# Patient Record
Sex: FEMALE | Race: WHITE | NOT HISPANIC OR LATINO | Employment: FULL TIME | URBAN - METROPOLITAN AREA
[De-identification: names, ages, dates, MRNs, and addresses within clinical notes are randomized per-mention and may not be internally consistent; named-entity substitution may affect disease eponyms.]

---

## 2024-04-05 ENCOUNTER — HOSPITAL ENCOUNTER (OUTPATIENT)
Dept: CARDIOLOGY | Facility: HOSPITAL | Age: 44
Discharge: HOME | End: 2024-04-05
Payer: COMMERCIAL

## 2024-04-05 VITALS — BODY MASS INDEX: 25.51 KG/M2 | WEIGHT: 158.73 LBS | HEIGHT: 66 IN

## 2024-04-05 VITALS
BODY MASS INDEX: 25.39 KG/M2 | WEIGHT: 158 LBS | HEIGHT: 66 IN | DIASTOLIC BLOOD PRESSURE: 82 MMHG | HEART RATE: 100 BPM | SYSTOLIC BLOOD PRESSURE: 124 MMHG

## 2024-04-05 DIAGNOSIS — R00.2 PALPITATIONS: ICD-10-CM

## 2024-04-05 LAB
ASCENDING AORTA: 2.7 CM
BH CV ECHO MEAS - AO MAX PG: 5.7 MMHG
BH CV ECHO MEAS - AO MEAN PG: 3.3 MMHG
BH CV ECHO MEAS - AO ROOT DIAM: 2.7 CM
BH CV ECHO MEAS - AO V2 MAX: 119.1 CM/SEC
BH CV ECHO MEAS - AO V2 VTI: 19.9 CM
BH CV ECHO MEAS - AVA(I,D): 2.9 CM2
BH CV ECHO MEAS - EDV(CUBED): 82.5 ML
BH CV ECHO MEAS - EDV(MOD-SP2): 76.4 ML
BH CV ECHO MEAS - EDV(MOD-SP4): 43.4 ML
BH CV ECHO MEAS - EF(MOD-BP): 61 %
BH CV ECHO MEAS - EF(MOD-SP2): 67.7 %
BH CV ECHO MEAS - EF(MOD-SP4): 54.2 %
BH CV ECHO MEAS - ESV(CUBED): 17.5 ML
BH CV ECHO MEAS - ESV(MOD-SP2): 24.7 ML
BH CV ECHO MEAS - ESV(MOD-SP4): 19.9 ML
BH CV ECHO MEAS - FS: 40.3 %
BH CV ECHO MEAS - IVS/LVPW: 0.8 CM
BH CV ECHO MEAS - IVSD: 0.72 CM
BH CV ECHO MEAS - LA DIMENSION: 3.2 CM
BH CV ECHO MEAS - LAT PEAK E' VEL: 17 CM/SEC
BH CV ECHO MEAS - LV MASS(C)D: 109.4 GRAMS
BH CV ECHO MEAS - LV MAX PG: 4.3 MMHG
BH CV ECHO MEAS - LV MEAN PG: 2.39 MMHG
BH CV ECHO MEAS - LV V1 MAX: 103.7 CM/SEC
BH CV ECHO MEAS - LV V1 VTI: 17.8 CM
BH CV ECHO MEAS - LVIDD: 4.4 CM
BH CV ECHO MEAS - LVIDS: 2.6 CM
BH CV ECHO MEAS - LVOT AREA: 3.2 CM2
BH CV ECHO MEAS - LVOT DIAM: 2.03 CM
BH CV ECHO MEAS - LVPWD: 0.9 CM
BH CV ECHO MEAS - MED PEAK E' VEL: 9 CM/SEC
BH CV ECHO MEAS - MV A MAX VEL: 86.9 CM/SEC
BH CV ECHO MEAS - MV DEC SLOPE: 574.7 CM/SEC2
BH CV ECHO MEAS - MV DEC TIME: 0.14 SEC
BH CV ECHO MEAS - MV E MAX VEL: 79 CM/SEC
BH CV ECHO MEAS - MV E/A: 0.91
BH CV ECHO MEAS - MV MAX PG: 2.6 MMHG
BH CV ECHO MEAS - MV MEAN PG: 1.45 MMHG
BH CV ECHO MEAS - MV V2 VTI: 17 CM
BH CV ECHO MEAS - MVA(VTI): 3.4 CM2
BH CV ECHO MEAS - PA ACC TIME: 0.09 SEC
BH CV ECHO MEAS - PA V2 MAX: 124.3 CM/SEC
BH CV ECHO MEAS - PI END-D VEL: 103.7 CM/SEC
BH CV ECHO MEAS - RAP SYSTOLE: 3 MMHG
BH CV ECHO MEAS - RVSP: 15 MMHG
BH CV ECHO MEAS - SV(LVOT): 57.9 ML
BH CV ECHO MEAS - SV(MOD-SP2): 51.7 ML
BH CV ECHO MEAS - SV(MOD-SP4): 23.5 ML
BH CV ECHO MEAS - TAPSE (>1.6): 1.8 CM
BH CV ECHO MEAS - TR MAX PG: 12.6 MMHG
BH CV ECHO MEAS - TR MAX VEL: 177.4 CM/SEC
BH CV ECHO MEASUREMENTS AVERAGE E/E' RATIO: 6.08
BH CV STRESS BP STAGE 1: NORMAL
BH CV STRESS BP STAGE 2: NORMAL
BH CV STRESS BP STAGE 3: NORMAL
BH CV STRESS DURATION MIN STAGE 1: 3
BH CV STRESS DURATION MIN STAGE 2: 3
BH CV STRESS DURATION MIN STAGE 3: 2
BH CV STRESS DURATION SEC STAGE 1: 0
BH CV STRESS DURATION SEC STAGE 2: 0
BH CV STRESS DURATION SEC STAGE 3: 25
BH CV STRESS GRADE STAGE 1: 10
BH CV STRESS GRADE STAGE 2: 12
BH CV STRESS GRADE STAGE 3: 14
BH CV STRESS HR STAGE 1: 141
BH CV STRESS HR STAGE 2: 166
BH CV STRESS HR STAGE 3: 187
BH CV STRESS METS STAGE 1: 5
BH CV STRESS METS STAGE 2: 7.5
BH CV STRESS METS STAGE 3: 10
BH CV STRESS O2 STAGE 1: 95
BH CV STRESS O2 STAGE 2: 96
BH CV STRESS O2 STAGE 3: 96
BH CV STRESS PROTOCOL 1: NORMAL
BH CV STRESS RECOVERY BP: NORMAL MMHG
BH CV STRESS RECOVERY HR: 122 BPM
BH CV STRESS RECOVERY O2: 96 %
BH CV STRESS SPEED STAGE 1: 1.7
BH CV STRESS SPEED STAGE 2: 2.5
BH CV STRESS SPEED STAGE 3: 3.4
BH CV STRESS STAGE 1: 1
BH CV STRESS STAGE 2: 2
BH CV STRESS STAGE 3: 3
BH CV VAS BP RIGHT ARM: NORMAL MMHG
BH CV XLRA - RV BASE: 3.1 CM
BH CV XLRA - RV LENGTH: 6.4 CM
BH CV XLRA - RV MID: 2.3 CM
BH CV XLRA - TDI S': 17 CM/SEC
IVRT: 57 MS
LEFT ATRIUM VOLUME INDEX: 25 ML/M2
MAXIMAL PREDICTED HEART RATE: 178 BPM
PERCENT MAX PREDICTED HR: 105.06 %
STRESS BASELINE BP: NORMAL MMHG
STRESS BASELINE HR: 99 BPM
STRESS O2 SAT REST: 96 %
STRESS PERCENT HR: 124 %
STRESS POST ESTIMATED WORKLOAD: 10.1 METS
STRESS POST EXERCISE DUR MIN: 8 MIN
STRESS POST EXERCISE DUR SEC: 25 SEC
STRESS POST O2 SAT PEAK: 96 %
STRESS POST PEAK BP: NORMAL MMHG
STRESS POST PEAK HR: 187 BPM
STRESS TARGET HR: 151 BPM

## 2024-04-05 PROCEDURE — 93306 TTE W/DOPPLER COMPLETE: CPT

## 2024-04-05 PROCEDURE — 93017 CV STRESS TEST TRACING ONLY: CPT

## 2024-04-08 ENCOUNTER — TELEPHONE (OUTPATIENT)
Dept: CARDIOLOGY | Facility: CLINIC | Age: 44
End: 2024-04-08
Payer: COMMERCIAL

## 2024-04-09 NOTE — TELEPHONE ENCOUNTER
Spoke with patient regarding plan of care, pt is going to give it a few weeks and keep an eye on her HR.

## 2024-08-21 NOTE — PROGRESS NOTES
Follow-up Visit      Date: 2024  Patient Name: Jackelin Yang  : 1980   MRN: [id number]     Chief Complaint:    Chief Complaint   Patient presents with    Palpitations       History of Present Illness: Jackelin Yang is a 43 y.o. female who is here today for follow-up on her palpitations.  She is still feeling that her heart rate goes fast all of a sudden.  All her symptoms usually started after she has COVID.  She did have the same symptoms during the stress test and all of a sudden her heart rate short from 90s to 130s.  We could not find her Holter monitor and we will repeat it again today.    When she does have palpitations and fast heart rate she has some shortness of breath and other symptoms.  She also has some lesions on her lungs after COVID which will be followed by pulmonologist.      Problem List     CARDIAC  Coronary Artery Disease:   2024 Stress test, normal ECG, negative for ischemia     Myocardium:   2024 Echo EF 65%     Valvular:   No valvular disease     Electrical:   Inappropriate sinus tachycardia     Pericardium:   Normal     CARDIAC RISK FACTORS:  None    NON-CARDIAC  Anxiety  Debi-Rodriguez viral infection  Allergies    SURGERIES  Cholecystectomy      Subjective      Review of Systems:   Review of Systems   Respiratory: Negative.     Cardiovascular: Negative.        Medications:     Current Outpatient Medications:     ibuprofen (ADVIL,MOTRIN) 800 MG tablet, Take 1 tablet by mouth Every 8 (Eight) Hours As Needed., Disp: , Rfl:     levocetirizine (Xyzal Allergy 24HR) 5 MG tablet, Take 1 tablet by mouth Every Evening., Disp: , Rfl:     vitamin B-12 (CYANOCOBALAMIN) 100 MCG tablet, Take 0.5 tablets by mouth Daily., Disp: , Rfl:     vitamin D3 125 MCG (5000 UT) capsule capsule, Take 1 capsule by mouth Daily., Disp: , Rfl:     Allergies:   Allergies   Allergen Reactions    Contrast Dye (Echo Or Unknown Ct/Mr) Hives    Iodinated Contrast Media Hives and  "Shortness Of Breath    Penicillins Anaphylaxis       Objective     Physical Exam:  Vitals:    08/22/24 1133   BP: 104/70   BP Location: Right arm   Patient Position: Sitting   Pulse: 74   SpO2: 99%   Weight: 69.4 kg (153 lb)   Height: 167.6 cm (66\")     Body mass index is 24.69 kg/m².    Constitutional:       General: Not in acute distress.     Appearance: Healthy appearance. Not in distress.     Neck:     JVP:Not elevated     Carotid artery: Normal    Pulmonary:      Effort: Pulmonary effort is normal.      Breath sounds: Normal breath sounds. No wheezing. No rhonchi. No rales.     Cardiovascular:      Normal rate. Regular rhythm. Normal S1. Normal S2.      Murmurs: There is no significant murmur.      No gallop. No click. No rub.     Abdominal:      General: Bowel sounds are normal.      Palpations: Abdomen is soft.      Tenderness: There is no abdominal tenderness.    Extremities:     Pulses:Normal radial and pedal pulses     Edema:no edema    Smoking Cessation:   Tobacco Product History : Patient never smoked    Lab Review:         Assessment / Plan      Assessment:   Diagnosis Plan   1. Palpitations  Holter Monitor - 72 Hour Up To 15 Days      2. Inappropriate sinus tachycardia             Plan:  We will go ahead and put a Holter monitor for further evaluation of her tachycardia.  I believe this is most likely related to her COVID.  We will go ahead and start her on bisoprolol 2.5 mg daily.  If her blood pressure went down we will start her on ivabradine 2.5 mg twice daily.      Follow Up:       Return in about 1 year (around 8/22/2025).    Aldo Davis MD    "

## 2024-08-22 ENCOUNTER — OFFICE VISIT (OUTPATIENT)
Dept: CARDIOLOGY | Facility: CLINIC | Age: 44
End: 2024-08-22
Payer: COMMERCIAL

## 2024-08-22 VITALS
SYSTOLIC BLOOD PRESSURE: 104 MMHG | HEIGHT: 66 IN | BODY MASS INDEX: 24.59 KG/M2 | WEIGHT: 153 LBS | HEART RATE: 74 BPM | DIASTOLIC BLOOD PRESSURE: 70 MMHG | OXYGEN SATURATION: 99 %

## 2024-08-22 DIAGNOSIS — R00.2 PALPITATIONS: Primary | ICD-10-CM

## 2024-08-22 DIAGNOSIS — I47.11 INAPPROPRIATE SINUS TACHYCARDIA: ICD-10-CM

## 2024-08-22 PROCEDURE — 99214 OFFICE O/P EST MOD 30 MIN: CPT | Performed by: INTERNAL MEDICINE

## 2024-08-22 RX ORDER — BISOPROLOL FUMARATE 5 MG/1
2.5 TABLET, FILM COATED ORAL DAILY
Qty: 90 TABLET | Refills: 1 | Status: SHIPPED | OUTPATIENT
Start: 2024-08-22

## 2024-08-22 RX ORDER — IBUPROFEN 800 MG/1
800 TABLET, FILM COATED ORAL EVERY 8 HOURS PRN
COMMUNITY
Start: 2024-06-10